# Patient Record
Sex: FEMALE | Race: WHITE | NOT HISPANIC OR LATINO | Employment: OTHER | URBAN - METROPOLITAN AREA
[De-identification: names, ages, dates, MRNs, and addresses within clinical notes are randomized per-mention and may not be internally consistent; named-entity substitution may affect disease eponyms.]

---

## 2017-09-07 ENCOUNTER — HOSPITAL ENCOUNTER (OUTPATIENT)
Dept: MAMMOGRAPHY | Facility: CLINIC | Age: 42
Discharge: HOME | End: 2017-09-07
Attending: OBSTETRICS & GYNECOLOGY | Admitting: OBSTETRICS & GYNECOLOGY
Payer: COMMERCIAL

## 2017-09-07 DIAGNOSIS — Z12.31 VISIT FOR SCREENING MAMMOGRAM: ICD-10-CM

## 2017-09-07 PROCEDURE — 77063 BREAST TOMOSYNTHESIS BI: CPT

## 2018-08-23 ENCOUNTER — RECORDS - HEALTHEAST (OUTPATIENT)
Dept: LAB | Facility: CLINIC | Age: 43
End: 2018-08-23

## 2018-08-23 LAB — TSH SERPL DL<=0.005 MIU/L-ACNC: 1.68 UIU/ML (ref 0.3–5)

## 2018-09-18 ENCOUNTER — HOSPITAL ENCOUNTER (OUTPATIENT)
Dept: MAMMOGRAPHY | Facility: CLINIC | Age: 43
Discharge: HOME | End: 2018-09-18
Attending: OBSTETRICS & GYNECOLOGY | Admitting: OBSTETRICS & GYNECOLOGY
Payer: COMMERCIAL

## 2018-09-18 DIAGNOSIS — Z12.31 VISIT FOR SCREENING MAMMOGRAM: ICD-10-CM

## 2018-09-18 PROCEDURE — 77063 BREAST TOMOSYNTHESIS BI: CPT

## 2018-10-19 ENCOUNTER — RECORDS - HEALTHEAST (OUTPATIENT)
Dept: LAB | Facility: CLINIC | Age: 43
End: 2018-10-19

## 2018-10-20 LAB — BACTERIA SPEC CULT: NO GROWTH

## 2018-11-20 ENCOUNTER — THERAPY VISIT (OUTPATIENT)
Dept: PHYSICAL THERAPY | Facility: CLINIC | Age: 43
End: 2018-11-20
Payer: COMMERCIAL

## 2018-11-20 DIAGNOSIS — N39.3 FEMALE STRESS INCONTINENCE: Primary | ICD-10-CM

## 2018-11-20 DIAGNOSIS — N81.89 PELVIC FLOOR WEAKNESS IN FEMALE: ICD-10-CM

## 2018-11-20 PROCEDURE — 97161 PT EVAL LOW COMPLEX 20 MIN: CPT | Mod: GP | Performed by: PHYSICAL THERAPIST

## 2018-11-20 PROCEDURE — 97110 THERAPEUTIC EXERCISES: CPT | Mod: GP | Performed by: PHYSICAL THERAPIST

## 2018-11-20 PROCEDURE — 97535 SELF CARE MNGMENT TRAINING: CPT | Mod: GP | Performed by: PHYSICAL THERAPIST

## 2018-11-20 NOTE — PROGRESS NOTES
[facility] Initial Evaluation  Subjective:  Patient is a 43 year old female presenting with rehab pelvic hpi. The history is provided by the patient. No  was used.                                    General health as reported by patient is excellent.  Pertinent medical history includes:  None.  Medical allergies: yes (morphine).    Medication history: BCP.  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        Barriers include:  None as reported by the patient.    Red flags:  None as reported by the patient.    SUBJECTIVE:  Patient reports onset of symptoms for as long as she can remember but maybe a little worse since having her kids. Symptoms include for as long as she can remember, she has had urine leakage with coughing and sneezing. She has had 3 children and notes that it was a little worse after that but no vaginal births. Saw OB for her regular check up and was told she should try PT. Since onset symptoms have been getting better, worse or staying the same? Slightly worse.      Verbal permission from patient to do internal pelvis muscle assessment? Yes Verbal permission to complete external perineal assessment? Yes Would you like someone else in the room as a   chaperone? No      Urination:  Do you leak on the way to the bathroom or with a strong urge to void? No   Do you leak with cough,sneeze, jumping, running?Yes, sometimes with these activities   Any other activities that cause leaking? No   Do you have triggers that make you feel you can't wait to go to the bathroom? No what are theyNA.  Type of pad and number used per day? Only during exercise, always for incontinence  When you leak what is the amount? Medium if she's running, if she's sneezing, just a little.   How long can you delay the need to urinate? 11 - 30 minutes.   How many times do you get up to urinate at night? 0   Can you stop the flow of urine when on the toilet? Yes  Is the volume of urine passed usually: average.  (8sec rule= 250ml with average bladder storing 400-600ml)  Do you feel empty when you are done? Yes  Do you strain to pass urine? No  Do you have a slow or hesitant urinary stream? No  Do you have difficulty initiating the urine stream? No  Is urination painful? No but in the past month, felt like she had a little bit of a burning sensation. Was tested for UTI which was negative.  How many bladder infections have you had in last 12 months? 1-2  Fluid intake(one glass is 8oz or one cup) 3 glasses/day, 1 (coffee) caffinated glasses/day  0 alcohol glasses/day.    Bowel habits:  Frequency of bowel movements? 4 times a week  Consistancy of stool? soft formed, [place] Stool Scale 4  Do you ignore the urge to defecate? No  Do you strain to pass stool? No    Aggrevating factors:  Is loss of stool associated with an activity (lifting, coughing, running) or a food)  No  Are there any foods that increase or decrease your symptoms?  No-maybe caffeine  Do you have any food allergies?  No      Sensation:   Can you tell if there is solid, liquid, or gas in the rectum?  Yes  Do you feel the urge to move your bowels?  Yes  Is the urge very strong and difficult to control? NA Or weak/absent? NA  Do you feel the rectum is empty with you finish a bowel movement?   Yes    Do you have abdominal pain?  Recently went back on BCP and has had more cramping. Does have PCOS and does have R pelvic pain with that.   Describe the quality of the pain: dull achy pain up to sharp shooting pain. When she wasn't on the pill, her ovary would swell before her period and be very painful.   What makes your abdominal pain worse? Changes in hormones, occasionally will be worse with bowel movements. [place]  What makes your abdominal pain better? The pill  Do you ever have pain that wakes you at night? No    Do you have rectal pain, pressure, or burning?  No     Pelvic Pain:  Do you have any pelvic pain with intercourse, exams, use of tampons? Yes  Is  initial penetration during intercourse painful? No  Is deeper penetration painful? Yes  Do you use lubricant? No What kind? NA  ?  Given birth? Yes Any complications? No  # of vaginal delieveries? 0  # of C-sections? 3  # of episiotomies?0.  Are you sexually active?Yes  Have you ever been worried for your physical safety? No    Do you have any depression, anxiety, panic attacks, excessive stress?  No  Any abdominal or pelvic surgeries? laproscopies to look at her ovary and abdomiplasty.  Are you having any regular exercise? Running, cycle, hip fit weight lifting  Have you practiced the PF(kegel) exercises for 4 or more weeks? No  Thyroid checked? Yes, normal (related to hair loss, flu-like symptoms, wt gain/loss, fatigue, menopause)  Changed diet lately? No    OBSERVATION  Lumbar Posture: Negative  Pelvic symmetry: Negative  Introitus: unremarkable  Trendelenberg Sign:Not Tested: Not indicated     ROM  Single leg standing unilateral hip flexion PSIS:Negative  Standing forward flexion PSIS:Negative  Passive Hip ROM:Negative  GEE:Negative  GEE with OP:Negative      MUSCLE PERFORMANCE  Active SLR:Not Tested: Not indicated   Abdominal Core 90/90 hip lower:Not Tested: Not indicated   Baseline PF tone:able to hold without bulge  PF Tone with cough: Able to hold without bulge  Valsalva: Normal  PF Response quality: brisk - normal  PF Power: Center: 4 Stronger on right/left symmetrical.  Endurance: Maximum contraction in seconds: 6  # of endurance contractions before fatigue: 4  Quick contraction repetitions prior to fatigue: 8.  Specificity/accessory muscles: WNL/WFL, Synergy with abdominals: .  Prolapse: Cyctocele/Rectocele/Uterine stgstrstastdstest:st st1st Urethrocele: slight, Enterocele: none.    PALPATION: Pain: no tenderness or tightness noted                        Objective:  System                                 Pelvic Dysfunction Evaluation:        Flexibility:      Tightness not present at:  Adductors; Hamstrings;  Piriformis or Gluteals    Abdominal Wall:  Abdominal wall pelvic: Does have large abdominal incision from ASIS to ASIS. Good overall scar mobility except in mid pubic region.        Pelvic Clock Exam:    Ischiocavernosis pain:  -  Bulbocavernosis pain:  -  Transverse Perineal:  -  Levator ANI:  -  Perineal Body:  -      External Assessment:    Skin Condition:  Normal    Bearing Down/Coughing:  Urethrocele      Muscle Contraction/Perineal Mobility:  Elevation and urogential triangle descent  Internal Assessment:      Contraction/Grade:  Good squeeze, good hold with lift, repeatable (4)                               General     ROS    Assessment/Plan:    Patient is a 43 year old female with pelvic complaints.    Patient has the following significant findings with corresponding treatment plan.                Diagnosis 1:  Urinary incontinence  Decreased ROM/flexibility - manual therapy, therapeutic exercise, therapeutic activity and home program  Decreased strength - therapeutic exercise, therapeutic activities and home program  Impaired muscle performance - biofeedback, neuro re-education and home program  Decreased function - therapeutic activities and home program    Therapy Evaluation Codes:   1) History comprised of:   Personal factors that impact the plan of care:      Time since onset of symptoms.    Comorbidity factors that impact the plan of care are:      None.     Medications impacting care: BCP.  2) Examination of Body Systems comprised of:   Body structures and functions that impact the plan of care:      Pelvis.   Activity limitations that impact the plan of care are:      Stress incontinence and Urinary incontinence.  3) Clinical presentation characteristics are:   Stable/Uncomplicated.  4) Decision-Making    Low complexity using standardized patient assessment instrument and/or measureable assessment of functional outcome.  Cumulative Therapy Evaluation is: Low complexity.    Previous and current  functional limitations:  (See Goal Flow Sheet for this information)    Short term and Long term goals: (See Goal Flow Sheet for this information)     Communication ability:  Patient appears to be able to clearly communicate and understand verbal and written communication and follow directions correctly.  Treatment Explanation - The following has been discussed with the patient:   RX ordered/plan of care  Anticipated outcomes  Possible risks and side effects  This patient would benefit from PT intervention to resume normal activities.   Rehab potential is good.    Frequency:  1 X week, once daily  Duration:  for 6-8 weeks  Discharge Plan:  Achieve all LTG.  Independent in home treatment program.  Reach maximal therapeutic benefit.    Please refer to the daily flowsheet for treatment today, total treatment time and time spent performing 1:1 timed codes.

## 2018-11-20 NOTE — LETTER
[facility]  [address] [place] [address]  [phone]    2018    Re: Sade Soliz   :   1975  MRN:  [id number]   REFERRING PHYSICIAN:   Zenobia Lipscomb    [facility]    Date of Initial Evaluation:  2018  Visits:  Rxs Used: 1  Reason for Referral:     Female stress incontinence  Pelvic floor weakness in female    EVALUATION SUMMARY    [facility] Initial Evaluation  Subjective:  Patient is a 43 year old female presenting with rehab pelvic hpi. The history is provided by the patient. No  was used.  General health as reported by patient is excellent.  Pertinent medical history includes:  None.  Medical allergies: yes (morphine).    Medication history: BCP.  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     Barriers include:  None as reported by the patient.  Red flags:  None as reported by the patient.    SUBJECTIVE:  Patient reports onset of symptoms for as long as she can remember but maybe a little worse since having her kids. Symptoms include for as long as she can remember, she has had urine leakage with coughing and sneezing. She has had 3 children and notes that it was a little worse after that but no vaginal births. Saw OB for her regular check up and was told she should try PT. Since onset symptoms have been getting better, worse or staying the same? Slightly worse.      Verbal permission from patient to do internal pelvis muscle    assessment? Yes Verbal permission to complete external perineal   assessment? Yes Would you like someone else in the room as a   chaperone? No      Urination:  Do you leak on the way to the bathroom or with a strong urge to void? No   Do you leak with cough,sneeze, jumping, running?Yes, sometimes with these activities   Any other activities that cause leaking? No   Re: Sade Soliz   :   1975      Do you have triggers that make you feel you can't wait to  go to the bathroom? No what are theyNA.  Type of pad and number used per day? Only during exercise, always for incontinence  When you leak what is the amount? Medium if she's running, if she's sneezing, just a little.   How long can you delay the need to urinate? 11 - 30 minutes.   How many times do you get up to urinate at night? 0   Can you stop the flow of urine when on the toilet? Yes  Is the volume of urine passed usually: average. (8sec rule= 250ml with average bladder storing 400-600ml)  Do you feel empty when you are done? Yes  Do you strain to pass urine? No  Do you have a slow or hesitant urinary stream? No  Do you have difficulty initiating the urine stream? No  Is urination painful? No but in the past month, felt like she had a little bit of a burning sensation. Was tested for UTI which was negative.  How many bladder infections have you had in last 12 months? 1-2  Fluid intake(one glass is 8oz or one cup) 3 glasses/day, 1 (coffee) caffinated glasses/day  0 alcohol glasses/day.    Bowel habits:  Frequency of bowel movements? 4 times a week  Consistancy of stool? soft formed, [place] Stool Scale 4  Do you ignore the urge to defecate? No  Do you strain to pass stool? No    Aggrevating factors:  Is loss of stool associated with an activity (lifting, coughing, running) or a food)  No  Are there any foods that increase or decrease your symptoms?  No-maybe caffeine  Do you have any food allergies?  No      Sensation:   Can you tell if there is solid, liquid, or gas in the rectum?  Yes  Do you feel the urge to move your bowels?  Yes  Is the urge very strong and difficult to control? NA Or weak/absent? NA  Do you feel the rectum is empty with you finish a bowel movement?   Yes      Re: Sade Soliz   :   1975      Do you have abdominal pain?  Recently went back on BCP and has had more cramping. Does have PCOS and does have R pelvic pain with that.   Describe the quality of the pain: dull achy  pain up to sharp shooting pain. When she wasn't on the pill, her ovary would swell before her period and be very painful.   What makes your abdominal pain worse? Changes in hormones, occasionally will be worse with bowel movements. [place]  What makes your abdominal pain better? The pill  Do you ever have pain that wakes you at night? No    Do you have rectal pain, pressure, or burning?  No     Pelvic Pain:  Do you have any pelvic pain with intercourse, exams, use of tampons? Yes  Is initial penetration during intercourse painful? No  Is deeper penetration painful? Yes  Do you use lubricant? No What kind? NA  ?  Given birth? Yes Any complications? No  # of vaginal delieveries? 0  # of C-sections? 3  # of episiotomies?0.  Are you sexually active?Yes  Have you ever been worried for your physical safety? No    Do you have any depression, anxiety, panic attacks, excessive   stress?  No  Any abdominal or pelvic surgeries? laproscopies to look at her ovary and abdomiplasty.  Are you having any regular exercise? Running, cycle, hip fit weight lifting  Have you practiced the PF(kegel) exercises for 4 or more weeks? No  Thyroid checked? Yes, normal (related to hair loss, flu-like symptoms, wt gain/loss, fatigue, menopause)  Changed diet lately? No    OBSERVATION  Lumbar Posture: Negative  Pelvic symmetry: Negative  Introitus: unremarkable  Trendelenberg Sign:Not Tested: Not indicated     ROM  Single leg standing unilateral hip flexion PSIS:Negative  Re: Sade Soliz   :   1975    Standing forward flexion PSIS:Negative  Passive Hip ROM:Negative  GEE:Negative  GEE with OP:Negative      MUSCLE PERFORMANCE  Active SLR:Not Tested: Not indicated   Abdominal Core 90/90 hip lower:Not Tested: Not indicated   Baseline PF tone:able to hold without bulge  PF Tone with cough: Able to hold without bulge  Valsalva: Normal  PF Response quality: brisk - normal  PF Power: Center: 4 Stronger on right/left  symmetrical.  Endurance: Maximum contraction in seconds: 6  # of endurance contractions before fatigue: 4  Quick contraction repetitions prior to fatigue: 8.  Specificity/accessory muscles: WNL/WFL, Synergy with abdominals: .  Prolapse: Cyctocele/Rectocele/Uterine stgstrstastdstest:st st1st Urethrocele: slight, Enterocele: none.    PALPATION: Pain: no tenderness or tightness noted             Pelvic Dysfunction Evaluation:    Flexibility:    Tightness not present at:  Adductors; Hamstrings; Piriformis or Gluteals    Abdominal Wall:  Abdominal wall pelvic: Does have large abdominal incision from ASIS to ASIS. Good overall scar mobility except in mid pubic region.  Pelvic Clock Exam:    Ischiocavernosis pain:  -  Bulbocavernosis pain:  -  Transverse Perineal:  -  Levator ANI:  -  Perineal Body:  -  External Assessment:    Skin Condition:  Normal  Bearing Down/Coughing:  Urethrocele  Muscle Contraction/Perineal Mobility:  Elevation and urogential triangle descent  Internal Assessment:    Contraction/Grade:  Good squeeze, good hold with lift, repeatable (4)                     Re: Sade Soliz   :   1975            Assessment/Plan:    Patient is a 43 year old female with pelvic complaints.    Patient has the following significant findings with corresponding treatment plan.                Diagnosis 1:  Urinary incontinence  Decreased ROM/flexibility - manual therapy, therapeutic exercise, therapeutic activity and home program  Decreased strength - therapeutic exercise, therapeutic activities and home program  Impaired muscle performance - biofeedback, neuro re-education and home program  Decreased function - therapeutic activities and home program    Therapy Evaluation Codes:   1) History comprised of:   Personal factors that impact the plan of care:      Time since onset of symptoms.    Comorbidity factors that impact the plan of care are:      None.     Medications impacting care: BCP.  2) Examination of Body Systems  comprised of:   Body structures and functions that impact the plan of care:      Pelvis.   Activity limitations that impact the plan of care are:      Stress incontinence and Urinary incontinence.  3) Clinical presentation characteristics are:   Stable/Uncomplicated.  4) Decision-Making    Low complexity using standardized patient assessment instrument and/or measureable assessment of functional outcome.  Cumulative Therapy Evaluation is: Low complexity.    Previous and current functional limitations:  (See Goal Flow Sheet for this information)    Short term and Long term goals: (See Goal Flow Sheet for this information)     Communication ability:  Patient appears to be able to clearly communicate and understand verbal and written communication and follow directions correctly.  Treatment Explanation - The following has been discussed with the patient:   RX ordered/plan of care  Anticipated outcomes  Possible risks and side effects  This patient would benefit from PT intervention to resume normal activities.   Rehab potential is good.                  Frequency:  1 X week, once daily  Duration:  for 6-8 weeks  Discharge Plan:  Achieve all LTG.  Independent in home treatment program.  Reach maximal therapeutic benefit.            Thank you for your referral.    INQUIRIES  Therapist: Milena Hussein, PT  [facility]  [address] [place] [address]  Phone: [phone]

## 2018-11-20 NOTE — MR AVS SNAPSHOT
"              After Visit Summary   2018    Sade Soliz    MRN: [id number]           Patient Information     Date Of Birth          1975        Visit Information        Provider Department      2018 12:50 PM Milena Hussein PT [facility]        Today's Diagnoses     Female stress incontinence    -  1    Pelvic floor weakness in female           Follow-ups after your visit        Who to contact     If you have questions or need follow up information about today's clinic visit or your schedule please contact [facility] directly at [phone].  Normal or non-critical lab and imaging results will be communicated to you by ObjectVideohart, letter or phone within 4 business days after the clinic has received the results. If you do not hear from us within 7 days, please contact the clinic through Sonicst or phone. If you have a critical or abnormal lab result, we will notify you by phone as soon as possible.  Submit refill requests through [employer] Mobile or call your pharmacy and they will forward the refill request to us. Please allow 3 business days for your refill to be completed.          Additional Information About Your Visit        MyChart Information     [employer] lets you send messages to your doctor, view your test results, renew your prescriptions, schedule appointments and more. To sign up, go to [email or web address] . Click on \"Log in\" on the left side of the screen, which will take you to the Welcome page. Then click on \"Sign up Now\" on the right side of the page.     You will be asked to enter the access code listed below, as well as some personal information. Please follow the directions to create your username and password.     Your access code is: [email or web address]  Expires: 2019  1:55 PM     Your access code will  in 90 days. If you need help or a new code, please call your [place] clinic or [phone].        Care EveryWhere ID     " This is your Care EveryWhere ID. This could be used by other organizations to access your [facility] medical records  [id number]         Blood Pressure from Last 3 Encounters:   No data found for BP    Weight from Last 3 Encounters:   No data found for Wt              We Performed the Following     HC PT EVAL, LOW COMPLEXITY     [facility] INITIAL EVAL REPORT     Self Care Management Training     THERAPEUTIC EXERCISES        Primary Care Provider Office Phone # Fax #    Zenobia Lipscomb [phone]       [facility] [address]  [place] [address]        Equal Access to Services     [facility]: Hadii aad ku hadasho Soomaali, waaxda luqadaha, qaybta kaalmada adeegyada, waxay idiin hayaan adeeg kharajose laDenishaaapanda . So [facility] [phone].    ATENCIÓN: Si habla español, tiene a ngo disposición servicios gratuitos de asistencia lingüística. Ll[facility] [phone].    We comply with applicable federal civil rights laws and [place] laws. We do not discriminate on the basis of race, color, national origin, age, disability, sex, sexual orientation, or gender identity.            Thank you!     Thank you for choosing [facility]  for your care. Our goal is always to provide you with excellent care. Hearing back from our patients is one way we can continue to improve our services. Please take a few minutes to complete the written survey that you may receive in the mail after your visit with us. Thank you!             Your Updated Medication List - Protect others around you: Learn how to safely use, store and throw away your medicines at www.disposemymeds.org.      Notice  As of 11/20/2018  1:55 PM    You have not been prescribed any medications.

## 2018-12-07 ENCOUNTER — THERAPY VISIT (OUTPATIENT)
Dept: PHYSICAL THERAPY | Facility: CLINIC | Age: 43
End: 2018-12-07
Payer: COMMERCIAL

## 2018-12-07 DIAGNOSIS — N81.89 PELVIC FLOOR WEAKNESS IN FEMALE: ICD-10-CM

## 2018-12-07 DIAGNOSIS — N39.3 FEMALE STRESS INCONTINENCE: ICD-10-CM

## 2018-12-07 PROCEDURE — 97112 NEUROMUSCULAR REEDUCATION: CPT | Mod: GP | Performed by: PHYSICAL THERAPIST

## 2018-12-07 PROCEDURE — 97530 THERAPEUTIC ACTIVITIES: CPT | Mod: GP | Performed by: PHYSICAL THERAPIST

## 2018-12-07 PROCEDURE — 97110 THERAPEUTIC EXERCISES: CPT | Mod: GP | Performed by: PHYSICAL THERAPIST

## 2018-12-07 NOTE — MR AVS SNAPSHOT
"              After Visit Summary   12/7/2018    Sade Soliz    MRN: [id number]           Patient Information     Date Of Birth          1975        Visit Information        Provider Department      12/7/2018 9:30 AM Milena Hussein PT [facility]        Today's Diagnoses     Female stress incontinence        Pelvic floor weakness in female           Follow-ups after your visit        Your next 10 appointments already scheduled     Dec 21, 2018 11:30 AM CST   [facility] with Milena Hussein    [facility] ([facility])    [address] [facility] [address]   [phone]            Jan 02, 2019  9:30 AM CST   [facility] with Milena Hussein [facility] ([facility]    [address] [facility] [address] [phone]              Who to contact     If you have questions or need follow up information about today's clinic visit or your schedule please contact [facility] directly at [phone].  Normal or non-critical lab and imaging results will be communicated to you by [employer], letter or phone within 4 business days after the clinic has received the results. If you do not hear from us within 7 days, please contact the clinic through [employer] or phone. If you have a critical or abnormal lab result, we will notify you by phone as soon as possible.  Submit refill requests through [employer] or call your pharmacy and they will forward the refill request to us. Please allow 3 business days for your refill to be completed.          Additional Information About Your Visit        MyChart Information     [employer] lets you send messages to your doctor, view your test results, renew your prescriptions, schedule appointments and more. To sign up, go to [email or web address] . Click on \"Log in\" on the left side of the screen, which will take you to the Welcome page. Then click on \"Sign up Now\" " on the right side of the page.     You will be asked to enter the access code listed below, as well as some personal information. Please follow the directions to create your username and password.     Your access code is: [email or web address]  Expires: 2019  1:55 PM     Your access code will  in 90 days. If you need help or a new code, please call your [facility] clinic or [phone].        Care EveryWhere ID     This is your Care EveryWhere ID. This could be used by other organizations to access your [facility] medical records  [id number]         Blood Pressure from Last 3 Encounters:   No data found for BP    Weight from Last 3 Encounters:   No data found for Wt              We Performed the Following     NEUROMUSCULAR RE-EDUCATION     THERAPEUTIC ACTIVITIES     THERAPEUTIC EXERCISES        Primary Care Provider Office Phone # Fax #    Zenobia Lipscomb [phone]       [facility] [address]  [place] [address]        Equal Access to Services     [facility]: Hadii aad ku hadasho Soomaali, waaxda luqadaha, qaybta kaalmada adeegyada, waxay idiin hayjohnien jermaine perdomo . So [facility] [phone].    ATENCIÓN: Si calin hernandez, tiene a ngo disposición servicios gratuitos de asistencia lingüística. Llame al [phone].    We comply with applicable federal civil rights laws and [place] laws. We do not discriminate on the basis of race, color, national origin, age, disability, sex, sexual orientation, or gender identity.            Thank you!     Thank you for choosing [facility]  for your care. Our goal is always to provide you with excellent care. Hearing back from our patients is one way we can continue to improve our services. Please take a few minutes to complete the written survey that you may receive in the mail after your visit with us. Thank you!             Your Updated Medication List - Protect others around you: Learn how to safely use, store and  throw away your medicines at www.disposemymeds.org.      Notice  As of 12/7/2018 10:10 AM    You have not been prescribed any medications.

## 2019-05-28 PROBLEM — N39.3 FEMALE STRESS INCONTINENCE: Status: RESOLVED | Noted: 2018-11-20 | Resolved: 2019-05-28

## 2019-05-28 PROBLEM — N81.89 PELVIC FLOOR WEAKNESS IN FEMALE: Status: RESOLVED | Noted: 2018-11-20 | Resolved: 2019-05-28

## 2019-05-28 NOTE — PROGRESS NOTES
DISCHARGE SUMMARY    Sade Soliz was seen 2 times for evaluation and treatment.  Patient did not return for further treatment and current status is unknown.  Due to short treatment duration, no objective or functional changes were made.  Please see goal flow sheet from episode noted date below and initial evaluation for further information.  Patient is discharged from therapy and therapy episode is resolved as of 05/28/19.      Linked Episodes   Type: Episode: Status: Noted: Resolved: Last update: Updated by:   PHYSICAL THERAPY urinary incontinence 11/20/18 Active 11/20/2018 12/7/2018  9:28 AM Milena Hussein, PT      Comments:

## 2020-11-16 ENCOUNTER — HOSPITAL ENCOUNTER (OUTPATIENT)
Dept: MAMMOGRAPHY | Facility: CLINIC | Age: 45
End: 2020-11-16
Attending: OBSTETRICS & GYNECOLOGY
Payer: COMMERCIAL

## 2020-11-16 DIAGNOSIS — Z12.31 SCREENING MAMMOGRAM, ENCOUNTER FOR: ICD-10-CM

## 2020-11-16 PROCEDURE — 77067 SCR MAMMO BI INCL CAD: CPT

## 2020-11-17 ENCOUNTER — HOSPITAL ENCOUNTER (OUTPATIENT)
Dept: MAMMOGRAPHY | Facility: CLINIC | Age: 45
End: 2020-11-17
Attending: OBSTETRICS & GYNECOLOGY
Payer: COMMERCIAL

## 2021-05-26 ENCOUNTER — RECORDS - HEALTHEAST (OUTPATIENT)
Dept: ADMINISTRATIVE | Facility: CLINIC | Age: 46
End: 2021-05-26

## 2021-06-01 ENCOUNTER — RECORDS - HEALTHEAST (OUTPATIENT)
Dept: ADMINISTRATIVE | Facility: CLINIC | Age: 46
End: 2021-06-01

## 2022-05-17 ENCOUNTER — E-VISIT (OUTPATIENT)
Dept: URGENT CARE | Facility: CLINIC | Age: 47
End: 2022-05-17
Payer: COMMERCIAL

## 2022-05-17 DIAGNOSIS — Z20.822 SUSPECTED COVID-19 VIRUS INFECTION: Primary | ICD-10-CM

## 2022-05-17 PROCEDURE — 99421 OL DIG E/M SVC 5-10 MIN: CPT | Performed by: EMERGENCY MEDICINE

## 2022-05-17 NOTE — PATIENT INSTRUCTIONS
Dear Sade,      Based on your responses, you may have coronavirus (COVID-19).     Will I be tested for COVID-19?  We would like to test you for COVID. I have placed orders for this test.     To schedule: go to your [employer] home page and scroll down to the section that says  You have an appointment that needs to be scheduled  and click the large green button that says  Schedule Now  and follow the steps to find the next available openings.    If you are unable to complete these [employer] scheduling steps, please call [phone] to schedule your testing.     These guidelines are for isolating before returning to work, school or .     For employers, schools and day cares: This is an official notice for this person s medical guidelines for returning in-person.     For health care sites: The CDC gives different isolation and quarantine guidelines for healthcare sites, please check with these sites before arriving.     How do I self-isolate?  You isolate when you have symptoms of COVID or a test shows you have COVID, even if you don t have symptoms.     If you DO have symptoms:  o Stay home and away from others  - For at least 5 days after your symptoms started, AND   - You are fever free for 24 hours (with no medicine that reduces fever), AND  - Your other symptoms are better.  o Wear a mask for 10 full days any time you are around others.    If you DON T have symptoms:  o Stay at home and away from others for at least 5 days after your positive test.  o Wear a mask for 10 full days any time you are around others.    How can I take care of myself?  Over the counter medications may help with your symptoms such as runny or stuffy nose, cough, chills, or fever.  Talk to your care team about your options.     Some people are at high risk of severe illness (for example, you have a weak immune system, you re 65 years or older, or you have certain medical problems). If your risk is high and your symptoms started in  the last 5 to 7 days, we strongly recommend for you to get COVID treatment as soon as possible. Paxlovid, Molnupiravir and the monoclonal antibody treatments are proven safe and effective, make you feel better faster, and prevent hospitalization and death.       To schedule an appointment to discuss COVID treatment, request an appointment on MyChart (select  COVID-19 Treatment ) or call [phone] ([phone]), press 7.      Get lots of rest. Drink extra fluids (unless a doctor has told you not to)    Take Tylenol (acetaminophen) or ibuprofen for fever or pain. If you have liver or kidney problems, ask your family doctor if it's okay to take Tylenol or ibuprofen    Take over the counter medications for your symptoms, as directed by your doctor. You may also talk to your pharmacist.      If you have other health problems (like cancer, heart failure, an organ transplant or severe kidney disease): Call your specialty clinic if you don't feel better in the next 2 days.    Know when to call 911. Emergency warning signs include:  o Trouble breathing or shortness of breath  o Pain or pressure in the chest that doesn't go away  o Feeling confused like you haven't felt before, or not being able to wake up  o Bluish-colored lips or face    Where can I get more information?    [facility] - About COVID-19: [email or web address]     CDC - What to Do If You're Sick: https://www.cdc.gov/coronavirus/2019-ncov/if-you-are-sick/index.html     CDC - Quarantine & Isolation: https://www.cdc.gov/coronavirus/2019-ncov/your-health/quarantine-isolation.html     [facility] clinical trials (COVID-19 research studies): [email or web address].[email or web address]/umn-clinical-trials    Below are the COVID-19 hotlines at the [facility] ([facility]). Interpreters are available.  o For health questions: Call [phone] or [phone] (7 a.m. to 7 p.m.)  o For questions about schools and childcare: Call  [phone] or [phone] (7 a.m. to 7 p.m.)

## 2022-05-24 ENCOUNTER — LAB (OUTPATIENT)
Dept: LAB | Facility: CLINIC | Age: 47
End: 2022-05-24
Attending: FAMILY MEDICINE
Payer: COMMERCIAL

## 2022-05-24 DIAGNOSIS — Z20.822 CLOSE EXPOSURE TO 2019 NOVEL CORONAVIRUS: ICD-10-CM

## 2022-05-24 DIAGNOSIS — Z20.822 SUSPECTED COVID-19 VIRUS INFECTION: ICD-10-CM

## 2022-05-24 PROCEDURE — U0003 INFECTIOUS AGENT DETECTION BY NUCLEIC ACID (DNA OR RNA); SEVERE ACUTE RESPIRATORY SYNDROME CORONAVIRUS 2 (SARS-COV-2) (CORONAVIRUS DISEASE [COVID-19]), AMPLIFIED PROBE TECHNIQUE, MAKING USE OF HIGH THROUGHPUT TECHNOLOGIES AS DESCRIBED BY CMS-2020-01-R: HCPCS

## 2022-05-24 PROCEDURE — U0005 INFEC AGEN DETEC AMPLI PROBE: HCPCS

## 2022-05-25 LAB — SARS-COV-2 RNA RESP QL NAA+PROBE: NEGATIVE

## 2022-05-26 ENCOUNTER — LAB REQUISITION (OUTPATIENT)
Dept: LAB | Facility: CLINIC | Age: 47
End: 2022-05-26
Payer: COMMERCIAL

## 2022-05-26 PROCEDURE — 88305 TISSUE EXAM BY PATHOLOGIST: CPT | Mod: TC,ORL | Performed by: OBSTETRICS & GYNECOLOGY

## 2022-05-26 PROCEDURE — 88305 TISSUE EXAM BY PATHOLOGIST: CPT | Mod: 26 | Performed by: PATHOLOGY

## 2022-05-27 LAB
PATH REPORT.COMMENTS IMP SPEC: NORMAL
PATH REPORT.COMMENTS IMP SPEC: NORMAL
PATH REPORT.FINAL DX SPEC: NORMAL
PATH REPORT.GROSS SPEC: NORMAL
PATH REPORT.MICROSCOPIC SPEC OTHER STN: NORMAL
PATH REPORT.RELEVANT HX SPEC: NORMAL
PHOTO IMAGE: NORMAL

## 2022-06-12 ENCOUNTER — HEALTH MAINTENANCE LETTER (OUTPATIENT)
Age: 47
End: 2022-06-12

## 2022-08-23 ENCOUNTER — HOSPITAL ENCOUNTER (OUTPATIENT)
Dept: MAMMOGRAPHY | Facility: CLINIC | Age: 47
Discharge: HOME | End: 2022-08-23
Attending: OBSTETRICS & GYNECOLOGY | Admitting: OBSTETRICS & GYNECOLOGY
Payer: COMMERCIAL

## 2022-08-23 DIAGNOSIS — Z12.31 VISIT FOR SCREENING MAMMOGRAM: ICD-10-CM

## 2022-08-23 PROCEDURE — 77067 SCR MAMMO BI INCL CAD: CPT

## 2022-10-23 ENCOUNTER — HEALTH MAINTENANCE LETTER (OUTPATIENT)
Age: 47
End: 2022-10-23

## 2023-04-02 ENCOUNTER — HEALTH MAINTENANCE LETTER (OUTPATIENT)
Age: 48
End: 2023-04-02

## 2023-11-11 ENCOUNTER — HEALTH MAINTENANCE LETTER (OUTPATIENT)
Age: 48
End: 2023-11-11

## 2024-03-30 ENCOUNTER — HEALTH MAINTENANCE LETTER (OUTPATIENT)
Age: 49
End: 2024-03-30

## 2024-10-26 ENCOUNTER — HEALTH MAINTENANCE LETTER (OUTPATIENT)
Age: 49
End: 2024-10-26

## 2024-11-25 ENCOUNTER — HOSPITAL ENCOUNTER (OUTPATIENT)
Dept: MAMMOGRAPHY | Facility: CLINIC | Age: 49
Discharge: HOME | End: 2024-11-25
Admitting: OBSTETRICS & GYNECOLOGY
Payer: COMMERCIAL

## 2024-11-25 DIAGNOSIS — Z12.31 VISIT FOR SCREENING MAMMOGRAM: ICD-10-CM

## 2024-11-25 PROCEDURE — 77067 SCR MAMMO BI INCL CAD: CPT

## 2025-04-13 ENCOUNTER — HEALTH MAINTENANCE LETTER (OUTPATIENT)
Age: 50
End: 2025-04-13